# Patient Record
Sex: FEMALE | Race: WHITE | URBAN - NONMETROPOLITAN AREA
[De-identification: names, ages, dates, MRNs, and addresses within clinical notes are randomized per-mention and may not be internally consistent; named-entity substitution may affect disease eponyms.]

---

## 2023-07-14 ENCOUNTER — OFFICE VISIT (OUTPATIENT)
Dept: URBAN - NONMETROPOLITAN AREA CLINIC 4 | Facility: CLINIC | Age: 38
End: 2023-07-14

## 2023-08-10 ENCOUNTER — DASHBOARD ENCOUNTERS (OUTPATIENT)
Age: 38
End: 2023-08-10

## 2023-08-10 ENCOUNTER — WEB ENCOUNTER (OUTPATIENT)
Dept: URBAN - METROPOLITAN AREA CLINIC 54 | Facility: CLINIC | Age: 38
End: 2023-08-10

## 2023-08-10 ENCOUNTER — OFFICE VISIT (OUTPATIENT)
Dept: URBAN - METROPOLITAN AREA CLINIC 54 | Facility: CLINIC | Age: 38
End: 2023-08-10
Payer: COMMERCIAL

## 2023-08-10 VITALS
HEART RATE: 76 BPM | SYSTOLIC BLOOD PRESSURE: 114 MMHG | HEIGHT: 67 IN | BODY MASS INDEX: 27.37 KG/M2 | WEIGHT: 174.4 LBS | TEMPERATURE: 97.2 F | DIASTOLIC BLOOD PRESSURE: 73 MMHG

## 2023-08-10 DIAGNOSIS — Z3A.10 10 WEEKS GESTATION OF PREGNANCY: ICD-10-CM

## 2023-08-10 DIAGNOSIS — Z86.010 HISTORY OF COLON POLYPS: ICD-10-CM

## 2023-08-10 DIAGNOSIS — K62.89 ANORECTAL PAIN: ICD-10-CM

## 2023-08-10 DIAGNOSIS — Z87.19 HISTORY OF ANAL FISSURES: ICD-10-CM

## 2023-08-10 PROBLEM — 428283002: Status: ACTIVE | Noted: 2023-08-10

## 2023-08-10 PROCEDURE — 99203 OFFICE O/P NEW LOW 30 MIN: CPT

## 2023-08-10 RX ORDER — LORATADINE 10 MG/1
1 TABLET TABLET ORAL ONCE A DAY
Status: ACTIVE | COMMUNITY

## 2023-08-10 NOTE — HPI-TODAY'S VISIT:
8/10/23: Pt is a 38 yo  SAB 4 female who is at 10 weeks gestation with hx of colon polyps and anal fissure who presents for complaining of anorectal pain during defecation x 5 months. State the pain feels like razor blades when stool comes out, then she has some burning that lasts about an hour after. No palpable hemorrhoids or bumps. Admits to minimal BRBPR on wet wipes. No abd pain. Denies diarrhea or constipation, but does have the sensation of incomplete defecation and has to strain. Typically has at least 1 BM/day, but has been taking Colace so now has 2-3 which is uncomfortable. Also doing Sitz baths after every BM when able. Applying topical cortisone and vaseline. Eating a high fiber diet and drinking fluids without much relief. Cscope in 2018 with one 4 mm SSP, 5 year surveillance recommended.  Colonoscopy 3/23/2018: - Perianal skin tags found on perianal exam. - Anal fissure. - One 4 mm polyp in the transverse colon, removed with a cold snare. Resected and retrieved. - The examination was otherwise normal Biopsy: SSP, surveillance 5 years